# Patient Record
(demographics unavailable — no encounter records)

---

## 2025-02-28 NOTE — DISCUSSION/SUMMARY
[FreeTextEntry1] : 60 YEAR OLD FEMALE LMP 2016 PRESENTS  FOR WELL WOMAN GYNECOLOGIC EVALUATION AND PAP.  LAST SEEN FOR WELL WOMAN VISIT 2/26/2024;  PAP AND HPV HPV TESTING DONE AT THAT TIME WERE NEGATIVE.  NO NEW MEDICAL OR SURGICAL HISTORY SINCE LAST VISIT. MEDICATION; CHORTHALADONE                          IBANDRONATE SODIUM  150 MG/MONTH MEDICATION ALLERGIES; NONE ROS;BMS-NORMAL; NO FAM HISTORY OF COLON CANCER;  LAST COLONOSCOPY 4/2021          NO URINARY COMPLAINTS          SEXUALLY ACTIVE WITH SLIGHT DRYNESS - LUBRICATION HELPS BREASTS;  DOES BREAST SELF EXAMINATION                     LAST MAMOMGRAPHY 12/6/2023 BIRADS II ; SCHEDULED FOR 3/2025 DOES NOT EXERCISE; NO MULTIVITAMINS; + DAIRY/CHEESE/YOGURT; NO TOBACCO OR VAPING BONE DENSITY TESTING DONE 4/2023 WITH LOSS, OSTEOPENIA AT LS AND HIP WITH T  SCORE                       -1.5 AT LS AND -1.7 AT FEMORAL NECK  TV US DONE  TODAY WITH ENDOMETRIAL LINING 2.78MM NORMAL;  2 SMALL MYOMAS LESS THAN 2 CM;   NORMAL OVARIES; NO ADNEXAL MASSES.  PE;/68; TEMP 97.4; WEIGHT 130 LBS HEIGHT 5'4"       BREASTS; NO MASSES; NO DISCHARGES       ABDMENS; SOFT, NO MASSES;  NO TENDERNESS TO PALPATION       PEVIC; NORMAL EXTERNAL GENITALIA                    NORMAL URETHRAL MEATUS                    NORMAL VAGINA WIHTOUT LESION                    NORMAL CERVIX WITHOUT LESION                    NO ANTEVERTED UTERUS                    NO ADNEXAL MASSES  IMP; WELL WOMAN          MENOPAUSE          MILD DYSPAREUNIA          OSTEOPENIA  PLAN; THIN PRE PAP PAP WITH HR HPV TESTING DONE-PT TO CALL IN 2 WKS FO RESULTS             BREAST SELF EXAMINATION MONTHLY ADVISED             SCREENING MAMMOGRAPHY ANNUALLY ADVISED AND REFERRAL SCIRPT GIVEN FOR                 12/2025.              BONE DENSITY TESTING ADVISED AND TO BE SCHEDULED